# Patient Record
(demographics unavailable — no encounter records)

---

## 2024-10-14 NOTE — HISTORY OF PRESENT ILLNESS
[FreeTextEntry1] : Ms. RUBY GARDUNO is 52-year-old female who is due to establish HEP care s/p ED visit with elevated ALP and Lipase.  She feels well. Denies c/o abdominal pain, pruritis, melena, GIB.  MH/o DM on insulin, metformin, Pioglitazone; HTN on amlodipine, Losartan Potassium, Farxiga; HLD on Lipitor 20 mg. Social H/O denies alcohol use or smoking/marijuana use. Reports no high-risk occupation/behavior. No pertinent MH/SH. No MH/O Liver diseases, clinical hepatitis, Jaundice.  PH/o ED visit (09/05/2024) [facility] p/w nausea and vomiting. Been on Ozempic but stopped about 1 month ago. COL/EGD - 12/2023, Denies any FH/O Liver disease or GI cancers. COVID IMM completed.  BW on 09/23/2024: , WDL-CBC, Normalized Lipase, Tgl 166, CHo 234, , A1c 6.9% US ab @ [facility] on 09/11/2024? Hepatomegaly (17.2 cm) Mild diffuse fatty infiltration of liver.  WNL abdomen on 09/05/2024 CTAP: Atherosclerotic calcification of the aorta more than expected for the stated age. Tiny fat-containing umbilical hernia. Degenerative changes. BW + , Lipase 75, Alb 5.1, AG 16, glucose 147, A1C 6.8% WBC 11.67 with ^ Neutrophil, Urine glucose > 1000, ^ SP gravity. WDL - Mammogram @ [facility] on 03/06/2024.   Additional Providers: PCP Dr. BRANDON RAMÍREZ

## 2024-10-14 NOTE — PHYSICAL EXAM
[Scleral Icterus] : No Scleral Icterus [Abdominal  Ascites] : no ascites [Asterixis] : no asterixis observed [Jaundice] : No jaundice [Depression] : no depression [General Appearance - Alert] : alert [Sclera] : the sclera and conjunctiva were normal [Outer Ear] : the ears and nose were normal in appearance [Neck Appearance] : the appearance of the neck was normal [Respiration, Rhythm And Depth] : normal respiratory rhythm and effort [Heart Rate And Rhythm] : heart rate was normal and rhythm regular [Edema] : there was no peripheral edema [Veins - Varicosity Changes] : there were no varicosital changes [Breast Appearance] : normal in appearance [Bowel Sounds] : normal bowel sounds [Cervical Lymph Nodes Enlarged Posterior Bilaterally] : posterior cervical [Supraclavicular Lymph Nodes Enlarged Bilaterally] : supraclavicular [Abnormal Walk] : normal gait [Involuntary Movements] : no involuntary movements were seen [] : no rash [No Focal Deficits] : no focal deficits [Oriented To Time, Place, And Person] : oriented to person, place, and time

## 2024-10-14 NOTE — ASSESSMENT
[FreeTextEntry1] : Ms. RUBY GARDUNO is 52-year-old female who is due to establish HEP care s/p ED visit with elevated ALP and Lipase.   BW on 09/23/2024: , WDL-CBC, Normalized Lipase, Tgl 166, CHo 234, , A1c 6.9% US ab @ [facility] on 09/11/2024? Hepatomegaly (17.2 cm) Mild diffuse fatty infiltration of liver.  # Elevated Liver Enzymes: Isolated ALP reviewed. Re-ordered with fasting BW. most probably r/t Metabolic dysfunction.   # MASLD + Fatty infiltration on US ab. Will ordered for FS to quantify the steatosis/Fibrosis. + HLD: on Atorvastatin 20 mg OD. + HTN: On Amlodipine 20 mg and Losartan OD. + Diabetic A1c% reviewed, On Metformin OD. + Obese class 1 BMI 32 weighs 175 lbs. RUBY was educated about the fatty liver disease. Reviewed the spectrum of disease, the risk of disease progression to developing cirrhosis and the associated complications. Explained the patient may develop liver cancer without cirrhosis and therefore should be under the yearly surveillance with an abdominal ultrasound. Taught back that the best treatment of fatty liver disease is diet and exercise. Discussed the present diet with the patient and recommended the avoidance of fatty foods and to follow a heart healthy diet. Also explained that weight loss may lead to an improvement in the overall underlying liver disease. Taught back the physiology of alcohol abstinence has an important contribution to liver health. Educated that the Fatty infiltration on the US ab is co-related to the Metabolic Dysfunctions and responds to weight loss and healthy living habits of dieting, exercising, regular bowels and alcohol consumption.   PLAN to F/u PCP if the FS scored shows no fibrosis. Encouraged to call back in the interim with any issues or concerns so that we can address and assist as required.

## 2024-10-14 NOTE — HISTORY OF PRESENT ILLNESS
[FreeTextEntry1] : Ms. RUBY GARDUNO is 52-year-old female who is due to establish HEP care s/p ED visit with elevated ALP and Lipase.  She feels well. Denies c/o abdominal pain, pruritis, melena, GIB.  MH/o DM on insulin, metformin, Pioglitazone; HTN on amlodipine, Losartan Potassium, Farxiga; HLD on Lipitor 20 mg. Social H/O denies alcohol use or smoking/marijuana use. Reports no high-risk occupation/behavior. No pertinent MH/SH. No MH/O Liver diseases, clinical hepatitis, Jaundice.  PH/o ED visit (09/05/2024) [facility] p/w nausea and vomiting. Been on Ozempic but stopped about 1 month ago. COL/EGD - 12/2023, Denies any FH/O Liver disease or GI cancers. COVID IMM completed.  BW on 09/23/2024: , WDL-CBC, Normalized Lipase, Tgl 166, CHo 234, , A1c 6.9% US ab @ [facility] on 09/11/2024? Hepatomegaly (17.2 cm) Mild diffuse fatty infiltration of liver.  WNL abdomen on 09/05/2024 CTAP: Atherosclerotic calcification of the aorta more than expected for the stated age. Tiny fat-containing umbilical hernia. Degenerative changes. BW + , Lipase 75, Alb 5.1, AG 16, glucose 147, A1C 6.8% WBC 11.67 with ^ Neutrophil, Urine glucose > 1000, ^ SP gravity. WDL - Mammogram @ [facility] on 03/06/2024.   Additional Providers: PCP Dr. BRANDON RAMÍREZ

## 2024-11-01 NOTE — REASON FOR VISIT
[Time Spent: ____ minutes] : Total time spent using  services: [unfilled] minutes. The patient's primary language is not English thus required  services. [Interpreters_IDNumber] : [id number] [Interpreters_FullName] : Feliz [[id number]] : Hallie

## 2024-11-01 NOTE — HISTORY OF PRESENT ILLNESS
[FreeTextEntry1] : Ms. RUBY GARDUNO is 52-year-old German speaking female who is being seen for follow up visit with TERRI. She feels well. Denies c/o abdominal pain, pruritis, melena, GIB.  PH/o establish HEP care s/p ED visit with elevated ALP and Lipase. Found to have Hepatomegaly and fatty infiltration of liver while initial work up. No pertinent MH/SH. No MH/O Liver diseases, clinical hepatitis, Jaundice.  MH/o DM on insulin, metformin, Pioglitazone; HTN on amlodipine, Losartan Potassium, Farxiga; HLD on Lipitor 20 mg. Social H/O denies alcohol use or smoking/marijuana use. Reports no high-risk occupation/behavior. Immigrated from [facility] in [facility].  PH/o ED visit (09/05/2024) [facility] p/w nausea and vomiting. Been on Ozempic but stopped about 1 month ago. COL/EGD - 12/2023, Denies any FH/O Liver disease or GI cancers. Immune to HAV, not to HBV, NR to anti-HBc, HBsAg and HCV. COVID IMM completed. Fibroscan on 10/25/2024 shows F0-F1 (E 3.7 kPa) and S3 ( dB/m)   BW in Oct 2024: Tgl 236, Cho 206, , Non-, Insufficient Vit D 27.8, WDL- CMP, GGT, INR, CBC noted MCHC 30.7. BW on 09/23/2024: , WDL-CBC, Normalized Lipase, Tgl 166, Cho 234, , A1c 6.9% US ab @ [facility] on 09/11/2024? Hepatomegaly (17.2 cm) Mild diffuse fatty infiltration of liver.  WNL abdomen on 09/05/2024 CTAP: Atherosclerotic calcification of the aorta more than expected for the stated age. Tiny fat-containing umbilical hernia. Degenerative changes. BW + , Lipase 75, Alb 5.1, AG 16, glucose 147, A1C 6.8% WBC 11.67 with ^ Neutrophil, Urine glucose > 1000, ^ SP gravity. WDL - Mammogram @ [facility] on 03/06/2024.   Additional Providers: PCP Dr. BRANDON RAMÍREZ

## 2024-11-01 NOTE — PHYSICAL EXAM
[Scleral Icterus] : No Scleral Icterus [Abdominal  Ascites] : no ascites [Asterixis] : no asterixis observed [Jaundice] : No jaundice [Depression] : no depression [General Appearance - Alert] : alert [Sclera] : the sclera and conjunctiva were normal [Neck Appearance] : the appearance of the neck was normal [Outer Ear] : the ears and nose were normal in appearance [Respiration, Rhythm And Depth] : normal respiratory rhythm and effort [Heart Rate And Rhythm] : heart rate was normal and rhythm regular [Edema] : there was no peripheral edema [Veins - Varicosity Changes] : there were no varicosital changes [Breast Appearance] : normal in appearance [Bowel Sounds] : normal bowel sounds [Cervical Lymph Nodes Enlarged Posterior Bilaterally] : posterior cervical [Supraclavicular Lymph Nodes Enlarged Bilaterally] : supraclavicular [Involuntary Movements] : no involuntary movements were seen [Abnormal Walk] : normal gait [] : no rash [No Focal Deficits] : no focal deficits [Oriented To Time, Place, And Person] : oriented to person, place, and time

## 2024-11-01 NOTE — ASSESSMENT
[FreeTextEntry1] : Ms. RUBY GARDUNO is 52-year-old Belarusian speaking female who is being seen for follow up visit with TERRI.  Fibroscan on 10/25/2024 shows F0-F1 (E 3.7 kPa) and S3 ( dB/m) BW in Oct 2024: Tgl 236, Cho 206, , Non-, Insufficient Vit D 27.8, WDL- CMP, GGT, INR, CBC noted MCHC 30.7. US ab @ [facility] on 09/11/2024? Hepatomegaly (17.2 cm) Mild diffuse fatty infiltration of liver.  # Elevated Liver Enzymes: Isolated ALP reviewed. Re-ordered with fasting BW. most probably r/t Metabolic dysfunction.  Educated that the Fatty infiltration on the US ab is co-related to the Metabolic Dysfunctions and responds to weight loss and healthy living habits of dieting, exercising, regular bowels and alcohol consumption.   # MASLD + on US ab. Reviewed the fibroscan with No fibrosis and > 67% steatosis. + HLD: on Atorvastatin 20 mg OD. + HTN: On Amlodipine 20 mg and Losartan OD. + Diabetic A1c% reviewed, On Metformin OD. Discussed the weight loss medications help to improve diabetes type 2, obesity, and MASH. Causes low appetite there by helping glucose control, prevents fibrosis progression, and reverses steatosis. Advised to maximize the dosage as tolerated every 4 weeks for effective weight loss thereby reducing fatty infiltration of liver. + Obese class 1 BMI 32 weighs 175 lbs. RUBY was educated about the fatty liver disease. Reviewed the spectrum of disease, the risk of disease progression to developing cirrhosis and the associated complications. Explained the patient may develop liver cancer without cirrhosis and therefore should be under the yearly surveillance with an abdominal ultrasound. Taught back that the best treatment of fatty liver disease is diet and exercise. Discussed the present diet with the patient and recommended the avoidance of fatty foods and to follow a heart healthy diet. Also explained that weight loss may lead to an improvement in the overall underlying liver disease. Taught back the physiology of alcohol abstinence has an important contribution to liver health.  PLAN to F/u with PCP as the FS scored shows no fibrosis.  Encouraged to call back in the interim with any issues or concerns so that we can address and assist as required.